# Patient Record
Sex: FEMALE | Race: ASIAN | NOT HISPANIC OR LATINO | Employment: UNEMPLOYED | URBAN - METROPOLITAN AREA
[De-identification: names, ages, dates, MRNs, and addresses within clinical notes are randomized per-mention and may not be internally consistent; named-entity substitution may affect disease eponyms.]

---

## 2017-12-29 ENCOUNTER — AMBULATORY - HEALTHEAST (OUTPATIENT)
Dept: FAMILY MEDICINE | Facility: CLINIC | Age: 22
End: 2017-12-29

## 2017-12-29 DIAGNOSIS — K62.5 BLOOD PER RECTUM: ICD-10-CM

## 2018-01-15 ENCOUNTER — OFFICE VISIT - HEALTHEAST (OUTPATIENT)
Dept: FAMILY MEDICINE | Facility: CLINIC | Age: 23
End: 2018-01-15

## 2018-01-15 ENCOUNTER — COMMUNICATION - HEALTHEAST (OUTPATIENT)
Dept: TELEHEALTH | Facility: CLINIC | Age: 23
End: 2018-01-15

## 2018-01-15 DIAGNOSIS — J30.9 ALLERGIC RHINITIS: ICD-10-CM

## 2018-01-15 DIAGNOSIS — R04.0 BLEEDING NOSE: ICD-10-CM

## 2018-01-15 DIAGNOSIS — H10.9 CONJUNCTIVITIS: ICD-10-CM

## 2018-01-15 DIAGNOSIS — T48.5X5A RHINITIS MEDICAMENTOSA: ICD-10-CM

## 2018-01-15 DIAGNOSIS — K62.5 BLEEDING PER RECTUM: ICD-10-CM

## 2018-01-15 DIAGNOSIS — J31.0 RHINITIS MEDICAMENTOSA: ICD-10-CM

## 2018-01-17 ENCOUNTER — COMMUNICATION - HEALTHEAST (OUTPATIENT)
Dept: FAMILY MEDICINE | Facility: CLINIC | Age: 23
End: 2018-01-17

## 2018-01-29 ENCOUNTER — COMMUNICATION - HEALTHEAST (OUTPATIENT)
Dept: FAMILY MEDICINE | Facility: CLINIC | Age: 23
End: 2018-01-29

## 2021-05-31 VITALS — WEIGHT: 146 LBS

## 2021-06-01 ENCOUNTER — RECORDS - HEALTHEAST (OUTPATIENT)
Dept: ADMINISTRATIVE | Facility: CLINIC | Age: 26
End: 2021-06-01

## 2021-06-15 NOTE — PROGRESS NOTES
"ASSESSMENT & PLAN      Cam Cotter was seen today for epistaxis and rectal bleeding.    Diagnoses and all orders for this visit:    Allergic rhinitis  -     Ambulatory referral to Allergy    Conjunctivitis  -     Ambulatory referral to Allergy    Bleeding nose    Bleeding per rectum    Rhinitis medicamentosa  -     Ambulatory referral to Allergy        No Follow-up on file.           CHIEF COMPLAINT: Cam Alvarez had concerns including Epistaxis and Rectal Bleeding.    [place]: 1.............. had concerns including Epistaxis and Rectal Bleeding.    1. Allergic rhinitis    2. Conjunctivitis    3. Bleeding nose    4. Bleeding per rectum    5. Rhinitis medicamentosa      No problem-specific Assessment & Plan notes found for this encounter.      CC:              Nosebleeds.    Feels like     2016 we talked about rhinitis medicamentosa she is taking a medicine that his naphazoline regularly and she states it helps  Always has mucus  Year-round  Feels clogged  There is itchiness  She also feels like her nasal area is bleeding left greater than right  Comes up both the back as well as the front      Also had bleeding from her bottom  Both blood in the toilet as well as on the paper  Feels like she is being torn  No palpable hemorrhoids  Is occasional last time it happened was in November  Her stools have been hard,  as a rule    She has 2 cats at home  She is with her partner  Does not use tobacco    What's it like:                  Heavy     How long is it ongoing:      \"Since I was little\"  What makes it worse :       na  What makes it better:         Na   0/10-10/10:Pain/Intesity     mild      Any associated Sx to above complaint:   Rectal bleeding with bowel movements. Inner corner of ees are itchy, red, pain on and off. Pt states she feels like there is fat on the back of her head.        SUBJECTIVE:  Cam Alvarez is a 22 y.o. female    No past medical history on file.  Past Surgical History:   Procedure Laterality Date     " SD REMOVAL OF OVARIAN CYST(S)      Description: Ovarian Cystectomy Right;  Recorded: 08/23/2012;     Review of patient's allergies indicates no known allergies.  Current Outpatient Prescriptions   Medication Sig Dispense Refill     loratadine (CLARITIN) 10 mg tablet Take 10 mg by mouth daily.       No current facility-administered medications for this visit.      No family history on file.  Social History     Social History     Marital status: Single     Spouse name: N/A     Number of children: N/A     Years of education: N/A     Social History Main Topics     Smoking status: Never Smoker     Smokeless tobacco: Never Used     Alcohol use No     Drug use: No     Sexual activity: Yes     Partners: Male     Birth control/ protection: None     Other Topics Concern     None     Social History Narrative     Patient Active Problem List   Diagnosis     Dysfunctional Uterine Bleeding                                              SOCIAL: She  reports that she has never smoked. She has never used smokeless tobacco. She reports that she does not drink alcohol or use illicit drugs.    REVIEW OF SYSTEMS:   Family history not pertinent to chief complaint or presenting problem    Review of systems otherwise negative as requested from patient, except   Those positive ROS outlined and discussed in [place].    OBJECTIVE:  /62  Pulse 66  Wt 146 lb (66.2 kg)  SpO2 99%    GENERAL:     No acute distress.   Alert and oriented X 3         Physical:    Sclera deep injection swelling of the conjunctiva  TMs clear  His mucosa left septum irritated hemorrhage areas  Right septum-year-old congested  Oropharynx is clear  She is Cavey left upper  Lungs are clear  Palpable thyroid no appreciable nodules  Cardiac no murmur  Mild allergic shiners  No edema  No tremulousness        ASSESSMENT & PLAN      Pa Cyndee was seen today for epistaxis and rectal bleeding.    Diagnoses and all orders for this visit:    Allergic rhinitis  -     Ambulatory  referral to Allergy    Conjunctivitis  -     Ambulatory referral to Allergy    Bleeding nose    Bleeding per rectum    Rhinitis medicamentosa  -     Ambulatory referral to Allergy        He will be referred to allergy  She may be having rhinitis medicamentosa from her eyedrops  She has 2 cats at home  I would favor a course of prednisone stop the naphazoline       No Follow-up on file.       Anticipatory Guidance and Symptomatic Cares Discussed   Advised to call back directly if there are further questions, or if these symptoms fail to improve as anticipated or worsen.  Return to clinic if patient has a clinical concern that warrants an exam.         25 Min Total Time, > 50% counseling and coordination of Care    Jose Eduardo Madrigal MD  Family Medicine   [facility] [address] [phone]

## 2021-06-27 ENCOUNTER — HEALTH MAINTENANCE LETTER (OUTPATIENT)
Age: 26
End: 2021-06-27

## 2021-07-04 ENCOUNTER — HOSPITAL ENCOUNTER (EMERGENCY)
Dept: EMERGENCY MEDICINE | Facility: HOSPITAL | Age: 26
Discharge: HOME | End: 2021-07-04
Attending: EMERGENCY MEDICINE

## 2021-07-04 DIAGNOSIS — J45.21 MILD INTERMITTENT ASTHMA WITH EXACERBATION: ICD-10-CM

## 2021-07-04 LAB
ALBUMIN SERPL-MCNC: 3.7 G/DL (ref 3.5–5)
ALP SERPL-CCNC: 85 U/L (ref 45–120)
ALT SERPL W P-5'-P-CCNC: 148 U/L (ref 0–45)
ANION GAP SERPL CALCULATED.3IONS-SCNC: 8 MMOL/L (ref 5–18)
AST SERPL W P-5'-P-CCNC: 106 U/L (ref 0–40)
BASOPHILS # BLD AUTO: 0.1 THOU/UL (ref 0–0.2)
BASOPHILS NFR BLD AUTO: 1 % (ref 0–2)
BILIRUB SERPL-MCNC: 0.4 MG/DL (ref 0–1)
BUN SERPL-MCNC: 12 MG/DL (ref 8–22)
CALCIUM SERPL-MCNC: 8.5 MG/DL (ref 8.5–10.5)
CHLORIDE BLD-SCNC: 107 MMOL/L (ref 98–107)
CO2 SERPL-SCNC: 25 MMOL/L (ref 22–31)
CREAT SERPL-MCNC: 0.69 MG/DL (ref 0.6–1.1)
D DIMER PPP FEU-MCNC: 0.63 FEU UG/ML
EOSINOPHIL # BLD AUTO: 0.2 THOU/UL (ref 0–0.4)
EOSINOPHIL NFR BLD AUTO: 2 % (ref 0–6)
ERYTHROCYTE [DISTWIDTH] IN BLOOD BY AUTOMATED COUNT: 13.7 % (ref 11–14.5)
GFR SERPL CREATININE-BSD FRML MDRD: >60 ML/MIN/1.73M2
GLUCOSE BLD-MCNC: 106 MG/DL (ref 70–125)
HCT VFR BLD AUTO: 39.7 % (ref 35–47)
HGB BLD-MCNC: 12.7 G/DL (ref 12–16)
IMM GRANULOCYTES # BLD: 0 THOU/UL
IMM GRANULOCYTES NFR BLD: 0 %
LYMPHOCYTES # BLD AUTO: 3.2 THOU/UL (ref 0.8–4.4)
LYMPHOCYTES NFR BLD AUTO: 36 % (ref 20–40)
MAGNESIUM SERPL-MCNC: 1.9 MG/DL (ref 1.8–2.6)
MCH RBC QN AUTO: 26.5 PG (ref 27–34)
MCHC RBC AUTO-ENTMCNC: 32 G/DL (ref 32–36)
MCV RBC AUTO: 83 FL (ref 80–100)
MONOCYTES # BLD AUTO: 0.2 THOU/UL (ref 0–0.9)
MONOCYTES NFR BLD AUTO: 3 % (ref 2–10)
NEUTROPHILS # BLD AUTO: 5.3 THOU/UL (ref 2–7.7)
NEUTROPHILS NFR BLD AUTO: 59 % (ref 50–70)
PLATELET # BLD AUTO: 76 THOU/UL (ref 140–440)
PMV BLD AUTO: 11.3 FL (ref 8.5–12.5)
POTASSIUM BLD-SCNC: 3.5 MMOL/L (ref 3.5–5)
PROT SERPL-MCNC: 7.9 G/DL (ref 6–8)
RBC # BLD AUTO: 4.79 MILL/UL (ref 3.8–5.4)
SARS-COV-2 PCR RESULT-HE - HISTORICAL: NEGATIVE
SODIUM SERPL-SCNC: 140 MMOL/L (ref 136–145)
TROPONIN I SERPL-MCNC: <0.01 NG/ML (ref 0–0.29)
WBC: 9.1 THOU/UL (ref 4–11)

## 2021-07-04 RX ORDER — ALBUTEROL SULFATE 90 UG/1
2 AEROSOL, METERED RESPIRATORY (INHALATION) EVERY 4 HOURS PRN
Qty: 1 INHALER | Refills: 0 | Status: SHIPPED | OUTPATIENT
Start: 2021-07-04 | End: 2022-03-08

## 2021-07-04 ASSESSMENT — MIFFLIN-ST. JEOR: SCORE: 1171.8

## 2021-07-04 NOTE — ED TRIAGE NOTES
ED Triage Notes by Fela Horowitz RN at 7/4/2021  4:37 PM     Author: Fela Horowitz RN Service: Post Procedure Care Author Type: Registered Nurse    Filed: 7/4/2021  4:37 PM Date of Service: 7/4/2021  4:37 PM Status: Signed    : Fela Horowitz RN (Registered Nurse)       Patient comes to ED with her  for shortness of breath that started 2 weeks ago.

## 2021-07-05 ENCOUNTER — COMMUNICATION - HEALTHEAST (OUTPATIENT)
Dept: SCHEDULING | Facility: CLINIC | Age: 26
End: 2021-07-05

## 2021-07-05 NOTE — ED PROVIDER NOTES
ED Provider Notes by Luis A Vivas MD at 7/4/2021  4:41 PM     Author: Luis A Vivas MD Service: -- Author Type: Physician    Filed: 7/4/2021  7:49 PM Date of Service: 7/4/2021  4:41 PM Status: Signed    : Luis A Vivas MD (Physician)       EMERGENCY DEPARTMENT ENCOUNTER      NAME: Cam Alvarez  AGE: 26 y.o. female  YOB: 1995  MRN: [id number]  EVALUATION DATE & TIME: 7/4/2021  4:39 PM    PCP: Provider, No Primary Care    ED PROVIDER: SIMÓN Vivas      Chief Complaint   Patient presents with   ? Shortness of Breath     FINAL IMPRESSION:  1. Mild intermittent asthma with exacerbation        ED COURSE & MEDICAL DECISION MAKING:    Pertinent Labs & Imaging studies reviewed. (See chart for details)  26 y.o. female presents to the Emergency Department for evaluation of shortness of breath.  History of mild intermittent asthma.  Usually patient utilizes an inhaler but is currently out of her albuterol.  Several days duration of symptoms.  Initially felt it was allergy mediated with some increased pressure and congestion but reports no runny nose.  She also did have a single left nare bloody nose preceding her symptoms.  It was minor per her report.  Now presenting with persistent shortness of breath.  Denies chest pain.  Symptoms worse with activity.  On clinical examination patient was well-appearing.  Unremarkable vital signs.  Clinical examination was normal with no concerning exam findings.  I recommended the patient that we proceed with imaging of her chest.  Laboratory testing including D-dimer and troponin ECG.  She is not COVID-19 vaccinated and have sent off COVID-19 testing.  Will reassess after return of her test results    4:58 PM I performed my initial history and physical exam as well as discussed ED course and plan.    7:43 PM I rechecked and updated the patient.    Patient overall is improved she felt the nebulization treatment did result in significant improvement to  her symptoms.  CTA performed due to shortness of breath and elevated D-dimer was negative with the exception of air trapping which likely secondary to her asthma.  She received an oral dose of steroids.  COVID-19 testing was negative.  Work-up otherwise unremarkable.  Plan of care for discharge.  Short course of steroids for acute asthmatic exacerbation.  She was out of her albuterol inhaler and this was provided as well as a prescription.  Discussed treatment plan return precautions prior to discharge.    Plan and all results were discussed  Time was taken to answer all questions. Patient and/or associated parties expressed understanding and were agreeable to the treatment plan.  Warning signs and ER return precautions provided. Discharged with discharge instructions outlining plan for further care and follow up.       MEDICATIONS GIVEN IN THE EMERGENCY:  Medications   sodium chloride flush 10 mL (NS) (10 mL Intravenous Given 7/4/21 1700)   predniSONE tablet 40 mg (DELTASONE) (has no administration in time range)   iopamidol solution 100 mL (ISOVUE-370) (100 mL Intravenous Given 7/4/21 1837)   ipratropium-albuteroL 0.5-2.5 mg/3 mL nebulizer solution 3 mL (DUO-NEB) (3 mL Nebulization Given 7/4/21 1924)       NEW PRESCRIPTIONS STARTED AT TODAY'S ER VISIT  Current Discharge Medication List      START taking these medications    Details   albuterol (PROAIR HFA;PROVENTIL HFA;VENTOLIN HFA) 90 mcg/actuation inhaler Inhale 2 puffs every 4 (four) hours as needed for wheezing.  Qty: 1 Inhaler, Refills: 0    Comments: May substitute the equivalent medication per insurance preference.  Associated Diagnoses: Mild intermittent asthma with exacerbation      predniSONE (DELTASONE) 20 MG tablet Take 40 mg by mouth daily for 4 days.  Qty: 8 tablet, Refills: 0    Associated Diagnoses: Mild intermittent asthma with exacerbation         CONTINUE these medications which have NOT CHANGED    Details   loratadine (CLARITIN) 10 mg tablet  Take 10 mg by mouth daily.                =================================================================    HPI    Patient information was obtained from: Patient    Use of Intrepreter: N/A         Pa Cyndee Alvarez is a 26 y.o. female who presents to the ED  with a relevant medical history of dysfunctional uterine bleeding via private car for evaluation of shortness of breath.    Patient reports shortness of breath and dizziness for the past 2 weeks but has gotten worse over past couple days following a bloody nose and notes increased sinus pressure and congestion. She adds that this might be due to an allergy but denies a runny nose. Patient comments that her symptoms are worsened with activity. She denies chest pain and has not received her COVID-19 vaccination. Patient denies any additional symptoms at this time.      REVIEW OF SYSTEMS   Review of Systems   HENT: Positive for congestion and sinus pressure. Negative for rhinorrhea.    Respiratory: Positive for shortness of breath.    Cardiovascular: Negative for chest pain.   Neurological: Positive for dizziness.   All other systems reviewed and are negative.       PAST MEDICAL HISTORY:  Asthma  Environmental allergies    PAST SURGICAL HISTORY:  Past Surgical History:   Procedure Laterality Date   ? ME REMOVAL OF OVARIAN CYST(S)      Description: Ovarian Cystectomy Right;  Recorded: 08/23/2012;           CURRENT MEDICATIONS:    No current facility-administered medications on file prior to encounter.      Current Outpatient Medications on File Prior to Encounter   Medication Sig   ? loratadine (CLARITIN) 10 mg tablet Take 10 mg by mouth daily.   Albuterol    ALLERGIES:  No Known Allergies    FAMILY HISTORY:  No family history on file.    SOCIAL HISTORY:   Social History     Socioeconomic History   ? Marital status:      Spouse name: Not on file   ? Number of children: Not on file   ? Years of education: Not on file   ? Highest education level: Not on file  "  Occupational History   ? Not on file   Social Needs   ? Financial resource strain: Not on file   ? Food insecurity     Worry: Not on file     Inability: Not on file   ? Transportation needs     Medical: Not on file     Non-medical: Not on file   Tobacco Use   ? Smoking status: Never Smoker   ? Smokeless tobacco: Never Used   Substance and Sexual Activity   ? Alcohol use: No   ? Drug use: No   ? Sexual activity: Yes     Partners: Male     Birth control/protection: None   Lifestyle   ? Physical activity     Days per week: Not on file     Minutes per session: Not on file   ? Stress: Not on file   Relationships   ? Social connections     Talks on phone: Not on file     Gets together: Not on file     Attends Tenriism service: Not on file     Active member of club or organization: Not on file     Attends meetings of clubs or organizations: Not on file     Relationship status: Not on file   ? Intimate partner violence     Fear of current or ex partner: Not on file     Emotionally abused: Not on file     Physically abused: Not on file     Forced sexual activity: Not on file   Other Topics Concern   ? Not on file   Social History Narrative   ? Not on file       VITALS:  Patient Vitals for the past 24 hrs:   BP Temp Temp src Pulse Resp SpO2 Height Weight   07/04/21 1930 107/69 -- -- 71 -- 100 % -- --   07/04/21 1815 113/66 -- -- 64 -- 97 % -- --   07/04/21 1800 105/64 -- -- 72 -- 97 % -- --   07/04/21 1745 108/67 -- -- 69 -- 96 % -- --   07/04/21 1730 113/72 -- -- 76 -- 98 % -- --   07/04/21 1715 108/70 -- -- 76 -- 96 % -- --   07/04/21 1700 106/67 -- -- 71 -- 96 % -- --   07/04/21 1645 131/83 -- -- 88 -- 97 % -- --   07/04/21 1644 117/77 -- -- 83 -- 97 % -- --   07/04/21 1635 114/74 97.8  F (36.6  C) Oral 79 18 95 % 4' 11\" (1.499 m) 116 lb (52.6 kg)       PHYSICAL EXAM   Constitutional:  Well developed, Well nourished, NAD  HENT:  Normocephalic, Atraumatic, Bilateral external ears normal, Oropharynx moist Nose normal.  " Bilateral nares were unremarkable with no abnormalities appreciated posterior oropharyngeal examination unremarkable with no abnormalities  Neck: Normal range of motion no lymphadenopathy no meningeal signs no palpable masses or swelling appreciated  Eyes: Conjunctiva normal, No discharge.   Respiratory:  Normal breath sounds, No respiratory distress, No wheezing.  No cough.  Cardiovascular:  Normal heart rate, Normal rhythm. No murmur appreciated.  Chest wall non-tender.  GI:  Soft, No tenderness, No masses, No flank tenderness.  No rebound or guarding.  : No CVA tenderness  Musculoskeletal: Full ROM.  No edema appreciated.  No cyanosis. Good ROM of major joints.  Integument:  Warm, Dry, No erythema, No rash.    Neurologic:  Alert & oriented.  No focal deficits appreciated.  Ambulatory.  Psychiatric:  Affect normal, Judgment normal, Mood normal.     LAB:  All pertinent labs reviewed and interpreted.  Results for orders placed or performed during the hospital encounter of 07/04/21   Troponin I   Result Value Ref Range    Troponin I <0.01 0.00 - 0.29 ng/mL   D-dimer, Quantitative   Result Value Ref Range    D-Dimer, Quant 0.63 (H) <=0.50 FEU ug/mL   Comprehensive Metabolic Panel   Result Value Ref Range    Sodium 140 136 - 145 mmol/L    Potassium 3.5 3.5 - 5.0 mmol/L    Chloride 107 98 - 107 mmol/L    CO2 25 22 - 31 mmol/L    Anion Gap, Calculation 8 5 - 18 mmol/L    Glucose 106 70 - 125 mg/dL    BUN 12 8 - 22 mg/dL    Creatinine 0.69 0.60 - 1.10 mg/dL    GFR MDRD Af Amer >60 >60 mL/min/1.73m2    GFR MDRD Non Af Amer >60 >60 mL/min/1.73m2    Bilirubin, Total 0.4 0.0 - 1.0 mg/dL    Calcium 8.5 8.5 - 10.5 mg/dL    Protein, Total 7.9 6.0 - 8.0 g/dL    Albumin 3.7 3.5 - 5.0 g/dL    Alkaline Phosphatase 85 45 - 120 U/L     (H) 0 - 40 U/L     (H) 0 - 45 U/L   Magnesium   Result Value Ref Range    Magnesium 1.9 1.8 - 2.6 mg/dL   Symptomatic SARS-CoV-2 (COVID-19)-PCR    Specimen: Respiratory   Result Value  Ref Range    SARS-CoV-2 PCR Result Negative Negative, Invalid   HM1 (CBC with Diff)   Result Value Ref Range    WBC 9.1 4.0 - 11.0 thou/uL    RBC 4.79 3.80 - 5.40 mill/uL    Hemoglobin 12.7 12.0 - 16.0 g/dL    Hematocrit 39.7 35.0 - 47.0 %    MCV 83 80 - 100 fL    MCH 26.5 (L) 27.0 - 34.0 pg    MCHC 32.0 32.0 - 36.0 g/dL    RDW 13.7 11.0 - 14.5 %    Platelets 76 (L) 140 - 440 thou/uL    MPV 11.3 8.5 - 12.5 fL    Neutrophils % 59 50 - 70 %    Lymphocytes % 36 20 - 40 %    Monocytes % 3 2 - 10 %    Eosinophils % 2 0 - 6 %    Basophils % 1 0 - 2 %    Immature Granulocyte % 0 <=0 %    Neutrophils Absolute 5.3 2.0 - 7.7 thou/uL    Lymphocytes Absolute 3.2 0.8 - 4.4 thou/uL    Monocytes Absolute 0.2 0.0 - 0.9 thou/uL    Eosinophils Absolute 0.2 0.0 - 0.4 thou/uL    Basophils Absolute 0.1 0.0 - 0.2 thou/uL    Immature Granulocyte Absolute 0.0 <=0.0 thou/uL       RADIOLOGY:  Reviewed all pertinent imaging. Please see official radiology report.  Cta Chest Pe Run    Result Date: 7/4/2021  EXAM: CTA CHEST PE RUN LOCATION: [facility] DATE/TIME: 7/4/2021 6:37 PM INDICATION: sob increased ddimer COMPARISON: None. TECHNIQUE: CT chest pulmonary angiogram during arterial phase injection of IV contrast. Multiplanar reformats and MIP reconstructions were performed. Dose reduction techniques were used. CONTRAST: Iopamidol (Isovue-370) 100mL FINDINGS: ANGIOGRAM CHEST: Pulmonary arteries are normal caliber and negative for pulmonary emboli. Thoracic aorta is negative for dissection. No CT evidence of right heart strain. LUNGS AND PLEURA: Mosaic attenuation in the right middle lobe and lung bases can be seen with air trapping. MEDIASTINUM/AXILLAE: Normal. CORONARY ARTERY CALCIFICATION: None. UPPER ABDOMEN: Hepatomegaly with fatty infiltration of the liver. MUSCULOSKELETAL: Normal.     1.  No pulmonary embolism. 2.  Mosaic attenuation in the right middle lobe and both lung bases can be seen with air trapping. 3.   Hepatomegaly with fatty infiltration of the liver.      EKG:    Performed at: 1740    Impression: Normal sinus Rhythm     Rate: 67 bpm  Rhythm: Normal  Axis: 28 18 15   ND Interval: 152 ms  QRS Interval: 88 ms  QTc Interval: 412 ms  ST Changes: None  Comparison: No previous EKG    I have independently reviewed and interpreted the EKG(s) documented above.      I, Kenan Ortega , am serving as a scribe to document services personally performed by Dr. Vivas based on my observation and the provider's statements to me. I, Luis A Vivas MD attest that Kenan Ortega is acting in a scribe capacity, has observed my performance of the services and has documented them in accordance with my direction.    Luis A Vivas M.D.  Emergency Medicine  [facility] EMERGENCY DEPARTMENT  [address]  [facility] [address]  Dept: [phone]  Loc: [phone]     Luis A Vivas MD  07/04/21 1949

## 2021-07-06 VITALS — BODY MASS INDEX: 23.39 KG/M2 | HEIGHT: 59 IN | WEIGHT: 116 LBS

## 2021-07-06 LAB
ATRIAL RATE - MUSE: 67 BPM
DIASTOLIC BLOOD PRESSURE - MUSE: NORMAL
INTERPRETATION ECG - MUSE: NORMAL
P AXIS - MUSE: 28 DEGREES
PR INTERVAL - MUSE: 152 MS
QRS DURATION - MUSE: 88 MS
QT - MUSE: 390 MS
QTC - MUSE: 412 MS
R AXIS - MUSE: 18 DEGREES
SYSTOLIC BLOOD PRESSURE - MUSE: NORMAL
T AXIS - MUSE: 15 DEGREES
VENTRICULAR RATE- MUSE: 67 BPM

## 2021-10-17 ENCOUNTER — HEALTH MAINTENANCE LETTER (OUTPATIENT)
Age: 26
End: 2021-10-17

## 2022-03-04 ENCOUNTER — TELEPHONE (OUTPATIENT)
Dept: FAMILY MEDICINE | Facility: CLINIC | Age: 27
End: 2022-03-04

## 2022-03-04 NOTE — TELEPHONE ENCOUNTER
Reason for Call:  Other call back    Detailed comments: Pt has not been seen for a while, but family calling and asking that PCP be notified that she has been diagnosed with Leukemia      FYI    Phone Number Patient can be reached at: Cell number on file:    Telephone Information:   Mobile [phone]       Best Time: anytime    Can we leave a detailed message on this number? YES    Call taken on 3/4/2022 at 11:43 AM by Bhavani Maharaj

## 2022-03-08 ENCOUNTER — OFFICE VISIT (OUTPATIENT)
Dept: FAMILY MEDICINE | Facility: CLINIC | Age: 27
End: 2022-03-08
Payer: COMMERCIAL

## 2022-03-08 ENCOUNTER — ANCILLARY PROCEDURE (OUTPATIENT)
Dept: GENERAL RADIOLOGY | Facility: CLINIC | Age: 27
End: 2022-03-08
Attending: FAMILY MEDICINE
Payer: COMMERCIAL

## 2022-03-08 VITALS
WEIGHT: 154 LBS | HEART RATE: 100 BPM | DIASTOLIC BLOOD PRESSURE: 70 MMHG | OXYGEN SATURATION: 98 % | BODY MASS INDEX: 31.1 KG/M2 | SYSTOLIC BLOOD PRESSURE: 122 MMHG

## 2022-03-08 DIAGNOSIS — E66.811 CLASS 1 OBESITY DUE TO EXCESS CALORIES WITH BODY MASS INDEX (BMI) OF 31.0 TO 31.9 IN ADULT, UNSPECIFIED WHETHER SERIOUS COMORBIDITY PRESENT: ICD-10-CM

## 2022-03-08 DIAGNOSIS — J40 BRONCHITIS: Primary | ICD-10-CM

## 2022-03-08 DIAGNOSIS — R91.8 ABNORMAL CT SCAN OF LUNG: ICD-10-CM

## 2022-03-08 DIAGNOSIS — D69.6 THROMBOCYTOPENIA (H): ICD-10-CM

## 2022-03-08 DIAGNOSIS — R79.9 ABNORMAL BLOOD FINDINGS: ICD-10-CM

## 2022-03-08 DIAGNOSIS — G56.01 CARPAL TUNNEL SYNDROME OF RIGHT WRIST: ICD-10-CM

## 2022-03-08 DIAGNOSIS — R74.02 NONSPECIFIC ELEVATION OF LEVELS OF TRANSAMINASE OR LACTIC ACID DEHYDROGENASE (LDH): ICD-10-CM

## 2022-03-08 DIAGNOSIS — R74.01 NONSPECIFIC ELEVATION OF LEVELS OF TRANSAMINASE OR LACTIC ACID DEHYDROGENASE (LDH): ICD-10-CM

## 2022-03-08 DIAGNOSIS — E66.09 CLASS 1 OBESITY DUE TO EXCESS CALORIES WITH BODY MASS INDEX (BMI) OF 31.0 TO 31.9 IN ADULT, UNSPECIFIED WHETHER SERIOUS COMORBIDITY PRESENT: ICD-10-CM

## 2022-03-08 DIAGNOSIS — K76.0 FATTY LIVER: Primary | ICD-10-CM

## 2022-03-08 LAB
BASOPHILS # BLD AUTO: 0.1 10E3/UL (ref 0–0.2)
BASOPHILS NFR BLD AUTO: 1 %
EOSINOPHIL # BLD AUTO: 0.1 10E3/UL (ref 0–0.7)
EOSINOPHIL NFR BLD AUTO: 1 %
ERYTHROCYTE [DISTWIDTH] IN BLOOD BY AUTOMATED COUNT: 16.5 % (ref 10–15)
HBA1C MFR BLD: 5.1 % (ref 0–5.6)
HCT VFR BLD AUTO: 45 % (ref 35–47)
HGB BLD-MCNC: 14.2 G/DL (ref 11.7–15.7)
IMM GRANULOCYTES # BLD: 0 10E3/UL
IMM GRANULOCYTES NFR BLD: 0 %
LYMPHOCYTES # BLD AUTO: 2.8 10E3/UL (ref 0.8–5.3)
LYMPHOCYTES NFR BLD AUTO: 37 %
MCH RBC QN AUTO: 24.9 PG (ref 26.5–33)
MCHC RBC AUTO-ENTMCNC: 31.6 G/DL (ref 31.5–36.5)
MCV RBC AUTO: 79 FL (ref 78–100)
MONOCYTES # BLD AUTO: 0.2 10E3/UL (ref 0–1.3)
MONOCYTES NFR BLD AUTO: 2 %
NEUTROPHILS # BLD AUTO: 4.4 10E3/UL (ref 1.6–8.3)
NEUTROPHILS NFR BLD AUTO: 59 %
NRBC # BLD AUTO: 0 10E3/UL
NRBC BLD AUTO-RTO: 0 /100
PLATELET # BLD AUTO: 77 10E3/UL (ref 150–450)
RBC # BLD AUTO: 5.7 10E6/UL (ref 3.8–5.2)
RETICS # AUTO: 0.06 10E6/UL (ref 0.01–0.11)
RETICS/RBC NFR AUTO: 1 % (ref 0.8–2.7)
WBC # BLD AUTO: 7.6 10E3/UL (ref 4–11)

## 2022-03-08 PROCEDURE — 99204 OFFICE O/P NEW MOD 45 MIN: CPT | Performed by: FAMILY MEDICINE

## 2022-03-08 PROCEDURE — 86682 HELMINTH ANTIBODY: CPT | Mod: 90 | Performed by: FAMILY MEDICINE

## 2022-03-08 PROCEDURE — 83021 HEMOGLOBIN CHROMOTOGRAPHY: CPT | Performed by: FAMILY MEDICINE

## 2022-03-08 PROCEDURE — 85025 COMPLETE CBC W/AUTO DIFF WBC: CPT | Performed by: FAMILY MEDICINE

## 2022-03-08 PROCEDURE — 83036 HEMOGLOBIN GLYCOSYLATED A1C: CPT | Performed by: FAMILY MEDICINE

## 2022-03-08 PROCEDURE — 82977 ASSAY OF GGT: CPT | Performed by: FAMILY MEDICINE

## 2022-03-08 PROCEDURE — 80076 HEPATIC FUNCTION PANEL: CPT | Performed by: FAMILY MEDICINE

## 2022-03-08 PROCEDURE — 85060 BLOOD SMEAR INTERPRETATION: CPT | Performed by: PATHOLOGY

## 2022-03-08 PROCEDURE — 99000 SPECIMEN HANDLING OFFICE-LAB: CPT | Performed by: FAMILY MEDICINE

## 2022-03-08 PROCEDURE — 83020 HEMOGLOBIN ELECTROPHORESIS: CPT | Performed by: FAMILY MEDICINE

## 2022-03-08 PROCEDURE — 71046 X-RAY EXAM CHEST 2 VIEWS: CPT | Mod: TC | Performed by: RADIOLOGY

## 2022-03-08 PROCEDURE — 85045 AUTOMATED RETICULOCYTE COUNT: CPT | Performed by: FAMILY MEDICINE

## 2022-03-08 PROCEDURE — 36415 COLL VENOUS BLD VENIPUNCTURE: CPT | Performed by: FAMILY MEDICINE

## 2022-03-08 RX ORDER — ONDANSETRON 4 MG/1
TABLET, FILM COATED ORAL
COMMUNITY
Start: 2022-03-05

## 2022-03-08 RX ORDER — DOXYCYCLINE 100 MG/1
100 CAPSULE ORAL 2 TIMES DAILY
Qty: 20 CAPSULE | Refills: 0 | Status: SHIPPED | OUTPATIENT
Start: 2022-03-08 | End: 2022-03-18

## 2022-03-08 RX ORDER — ALBENDAZOLE 200 MG/1
TABLET, FILM COATED ORAL
COMMUNITY
Start: 2022-03-05

## 2022-03-08 NOTE — PATIENT INSTRUCTIONS
Thanks for coming in today PaShia and Bee    Things I have understood from the prior exams    You had a history of a slightly abnormal CT scan although it does not sound like you are having any coughing issues    We will go ahead and repeat your chest x-ray    You have elevated liver test  You have evidence of a fatty liver on your CT scan  The way to remedy this is eliminate simple sugars, at all added sugars, and simple carbohydrates from your nutrition as well as engage in cardiovascular fitness  A ketogenic diet would be reasonable for you    You likely have carpal tunnel syndrome  You should wear a carpal tunnel brace  When you get back to [place] or in the future you should see one of our hand surgeons here to evaluate for underlying carpal tunnel syndrome  I will make a referral to an orthopedist

## 2022-03-08 NOTE — PROGRESS NOTES
Problem List Items Addressed This Visit        Nervous and Auditory    Carpal tunnel syndrome of right wrist    Relevant Orders    Orthopedic  Referral      Other Visit Diagnoses     Fatty liver    -  Primary    Relevant Orders    Hemoglobin A1c (Completed)    Nonspecific elevation of levels of transaminase or lactic acid dehydrogenase (LDH)        Relevant Orders    Lab Blood Morphology Pathologist Review (Completed)    Strongyloides antibody IgG (Completed)    GGT (Completed)    Hepatic panel (Albumin, ALT, AST, Bili, Alk Phos, TP) (Completed)    Thrombocytopenia (H)        Relevant Orders    Lab Blood Morphology Pathologist Review (Completed)    Class 1 obesity due to excess calories with body mass index (BMI) of 31.0 to 31.9 in adult, unspecified whether serious comorbidity present        Relevant Orders    Hemoglobin A1c (Completed)    Abnormal CT scan of lung        Relevant Orders    XR Chest 2 Views (Completed)    Abnormal blood findings        Relevant Orders    Hemoglobinopathy/Thalassemia Cascade        Taking albendazole Stools better  No Diarrhea   Saw worms   On med  Less Itching   Had Low Platelets     ASSESSMENT & PLAN    No problem-specific Assessment & Plan notes found for this encounter.      Cam Cotter was seen today for low blood counts.    Diagnoses and all orders for this visit:    Fatty liver  -     Hemoglobin A1c; Future    Nonspecific elevation of levels of transaminase or lactic acid dehydrogenase (LDH)  -     Lab Blood Morphology Pathologist Review; Future  -     Strongyloides antibody IgG; Future  -     GGT; Future  -     Hepatic panel (Albumin, ALT, AST, Bili, Alk Phos, TP); Future    Thrombocytopenia (H)  -     Lab Blood Morphology Pathologist Review; Future    Class 1 obesity due to excess calories with body mass index (BMI) of 31.0 to 31.9 in adult, unspecified whether serious comorbidity present  -     Hemoglobin A1c; Future        There are no Patient Instructions on file for  this visit.    No follow-ups on file.       PATIENT HEALTH QUESTIONNAIRE-9 (PHQ - 9)    Over the last 2 weeks, how often have you been bothered by any of the following problems?    1. Little interest or pleasure in doing things -      2. Feeling down, depressed, or hopeless -      3. Trouble falling or staying asleep, or sleeping too much -     4. Feeling tired or having little energy -      5. Poor appetite or overeating -      6. Feeling bad about yourself - or that you are a failure or have let yourself or your family down -      7. Trouble concentrating on things, such as reading the newspaper or watching television -     8. Moving or speaking so slowly that other people could have noticed? Or the opposite - being so fidgety or restless that you have been moving around a lot more than usual     9. Thoughts that you would be better off dead or of hurting  yourself in some way     Total Score:       If you checked off any problems, how difficult have these problems made it for you to do your work, take care of things at home, or get along with other people?      Developed by Francois Hernandez, Luna Damon, Umer Hauser and colleagues, with an educational lianet from [employer] No permission required to reproduce, translate, display or distribute. permission required to reproduce, translate, display or distribute.    CHIEF COMPLAINT: Cam Alvarez had concerns including low blood counts.    [place]: 1.............. SUBJECTIVE:  Cam Alvarez is a 26 year old female had concerns including low blood counts.    1. Fatty liver    2. Nonspecific elevation of levels of transaminase or lactic acid dehydrogenase (LDH)    3. Thrombocytopenia (H)    4. Class 1 obesity due to excess calories with body mass index (BMI) of 31.0 to 31.9 in adult, unspecified whether serious comorbidity present          No Known Allergies                      SOCIAL: She  reports that she has never smoked. She has never used smokeless  tobacco. She reports that she does not drink alcohol and does not use drugs.    REVIEW OF SYSTEMS:   Family history not pertinent to chief complaint or presenting problem    Review of systems otherwise negative as requested from patient, except   Those positive ROS outlined and discussed in [place].      VITALS:  Vitals:    03/08/22 1545   BP: 122/70   Pulse: 100   SpO2: 98%   Weight: 69.9 kg (154 lb)     Wt Readings from Last 3 Encounters:   03/08/22 69.9 kg (154 lb)   01/15/18 66.2 kg (146 lb)   01/14/16 61.3 kg (135 lb 4 oz)     Body mass index is 31.1 kg/m .    Physical Exam:  Positive Tinel's sign negative Spurling's maneuver    Lungs clear  Cardiac no murmur  No thyromegaly  No appreciable spleen or liver edge    Recent Results (from the past 240 hour(s))   Strongyloides antibody IgG    Collection Time: 03/08/22  4:35 PM   Result Value Ref Range    Strongyloides Robyn IgG 0.7 <=0.9 IV   GGT    Collection Time: 03/08/22  4:35 PM   Result Value Ref Range    GGT 43 0 - 50 U/L   Hepatic panel (Albumin, ALT, AST, Bili, Alk Phos, TP)    Collection Time: 03/08/22  4:35 PM   Result Value Ref Range    Bilirubin Total 0.6 0.0 - 1.0 mg/dL    Bilirubin Direct 0.2 <=0.5 mg/dL    Protein Total 8.0 6.0 - 8.0 g/dL    Albumin 3.8 3.5 - 5.0 g/dL    Alkaline Phosphatase 95 45 - 120 U/L    AST 57 (H) 0 - 40 U/L    ALT 86 (H) 0 - 45 U/L   Hemoglobin A1c    Collection Time: 03/08/22  4:35 PM   Result Value Ref Range    Hemoglobin A1C 5.1 0.0 - 5.6 %   Bld morphology pathology review    Collection Time: 03/08/22  4:35 PM   Result Value Ref Range    Final Diagnosis       PERIPHERAL BLOOD:    HYPOCHROMIC ERYTHROCYTOSIS (PLEASE SEE COMMENT)    PENDING HEMOGLOBIN STUDIES    THROMBOCYTOPENIA      Comment       The limited clinical history has been reviewed. The high normal hemoglobin and hematocrit tend to argue against an underlying disorder of globin synthesis; however, dehydration or a similar process certainly could complicate the issue.  The limited clinical history precludes comparison to prior CBCs. Please correlate with family history and iron studies. I am happy to review any subsequent data. We will revisit this case when I review the hemoglobin studies.       Clinical Information       Low platelets      Peripheral Smear       Red blood cells are increased in number and overall hypochromic and borderline microcytic. Anisopoikilocytosis and polychromasia are increased but rouleaux formation does not appear prominent.    The white blood cell count and differential appear as reported on the CBC. Leukocytes are normal in number and appearance, consisting predominantly of segmented and band neutrophils with fewer numbers of lymphocytes and monocytes. No blasts or dysplastic changes are identified.    Platelets are decreased in number but overall normal in appearance.       Performing Labs       The technical component of this testing was completed at [facility]     CBC with platelets and differential    Collection Time: 03/08/22  4:35 PM   Result Value Ref Range    WBC Count 7.6 4.0 - 11.0 10e3/uL    RBC Count 5.70 (H) 3.80 - 5.20 10e6/uL    Hemoglobin 14.2 11.7 - 15.7 g/dL    Hematocrit 45.0 35.0 - 47.0 %    MCV 79 78 - 100 fL    MCH 24.9 (L) 26.5 - 33.0 pg    MCHC 31.6 31.5 - 36.5 g/dL    RDW 16.5 (H) 10.0 - 15.0 %    Platelet Count 77 (L) 150 - 450 10e3/uL    % Neutrophils 59 %    % Lymphocytes 37 %    % Monocytes 2 %    % Eosinophils 1 %    % Basophils 1 %    % Immature Granulocytes 0 %    NRBCs per 100 WBC 0 <1 /100    Absolute Neutrophils 4.4 1.6 - 8.3 10e3/uL    Absolute Lymphocytes 2.8 0.8 - 5.3 10e3/uL    Absolute Monocytes 0.2 0.0 - 1.3 10e3/uL    Absolute Eosinophils 0.1 0.0 - 0.7 10e3/uL    Absolute Basophils 0.1 0.0 - 0.2 10e3/uL    Absolute Immature Granulocytes 0.0 <=0.4 10e3/uL    Absolute NRBCs 0.0 10e3/uL   Reticulocyte count    Collection Time: 03/08/22  4:35 PM   Result Value Ref Range    %  Reticulocyte 1.0 0.8 - 2.7 %    Absolute Reticulocyte 0.055 0.010 - 0.110 10e6/uL          I spent 30  minutes with this patient.  This includes pre-visit, intra-visit and post visit work an evaluation with regards to Cam Cotter was seen today for low blood counts.    Diagnoses and all orders for this visit:    Fatty liver  -     Hemoglobin A1c; Future    Nonspecific elevation of levels of transaminase or lactic acid dehydrogenase (LDH)  -     Lab Blood Morphology Pathologist Review; Future  -     Strongyloides antibody IgG; Future  -     GGT; Future  -     Hepatic panel (Albumin, ALT, AST, Bili, Alk Phos, TP); Future    Thrombocytopenia (H)  -     Lab Blood Morphology Pathologist Review; Future    Class 1 obesity due to excess calories with body mass index (BMI) of 31.0 to 31.9 in adult, unspecified whether serious comorbidity present  -     Hemoglobin A1c; Future        Jose Eduardo Madrigal MD  Family Medicine   [facility] [phone]

## 2022-03-09 LAB
ALBUMIN SERPL-MCNC: 3.8 G/DL (ref 3.5–5)
ALP SERPL-CCNC: 95 U/L (ref 45–120)
ALT SERPL W P-5'-P-CCNC: 86 U/L (ref 0–45)
AST SERPL W P-5'-P-CCNC: 57 U/L (ref 0–40)
BILIRUB DIRECT SERPL-MCNC: 0.2 MG/DL
BILIRUB SERPL-MCNC: 0.6 MG/DL (ref 0–1)
GGT SERPL-CCNC: 43 U/L (ref 0–50)
PATH REPORT.COMMENTS IMP SPEC: NORMAL
PATH REPORT.COMMENTS IMP SPEC: NORMAL
PATH REPORT.FINAL DX SPEC: NORMAL
PATH REPORT.MICROSCOPIC SPEC OTHER STN: NORMAL
PATH REPORT.RELEVANT HX SPEC: NORMAL
PROT SERPL-MCNC: 8 G/DL (ref 6–8)

## 2022-03-09 NOTE — TELEPHONE ENCOUNTER
DIAGNOSIS: Carpal tunnel syndrome of right wrist/no imaging/Jose Eduardo Madrigal   APPOINTMENT DATE: 3.10.22   NOTES STATUS DETAILS   OFFICE NOTE from referring provider Internal 3.8.22 [facility]   MEDICATION LIST Internal

## 2022-03-10 ENCOUNTER — PRE VISIT (OUTPATIENT)
Dept: ORTHOPEDICS | Facility: CLINIC | Age: 27
End: 2022-03-10
Payer: COMMERCIAL

## 2022-03-10 ENCOUNTER — OFFICE VISIT (OUTPATIENT)
Dept: ORTHOPEDICS | Facility: CLINIC | Age: 27
End: 2022-03-10
Attending: FAMILY MEDICINE
Payer: COMMERCIAL

## 2022-03-10 VITALS — BODY MASS INDEX: 31.04 KG/M2 | WEIGHT: 154 LBS | HEIGHT: 59 IN

## 2022-03-10 DIAGNOSIS — G56.01 CARPAL TUNNEL SYNDROME OF RIGHT WRIST: ICD-10-CM

## 2022-03-10 PROCEDURE — 99203 OFFICE O/P NEW LOW 30 MIN: CPT | Performed by: FAMILY MEDICINE

## 2022-03-10 NOTE — LETTER
"  3/10/2022      RE: Cam Alvarez  [address] [place] [address]       Sports Medicine Clinic Visit    PCP: Jose Eduardo Madrigal Cyndee Alvarez is a 26 year old female who is seen  in consultation at the request of Dr. Madrigal presenting with right wrist carpal tunnel syndrome sx. Right hand dominant.     She has a tendency to notice aching discomfort in the forearm and into the hand that is worse in the early hours of the morning.  She will have numbness that she will feel \"in all the fingers\" of the right hand, although the numbness is intermittent.  It can be present towards the end of the day.  Is not present at this time.  Does happen intermittently.    She is not involved in employment that involves repetitive gripping and grasping at this time.     Injury: No injury, onset about 6 months ago    Location of Pain: right wrist and hand  Duration of Pain: 6 months  Rating of Pain: 0/10  Pain is better with: massage  Pain is worse with: at night   Additional Features: paresthesias   Treatment so far consists of: massage  Prior History of related problems: none    Ht 1.499 m (4' 11\")   Wt 69.9 kg (154 lb)   BMI 31.10 kg/m         PMH:  No past medical history on file.    Active problem list:  Patient Active Problem List   Diagnosis     Dysfunctional Uterine Bleeding     Carpal tunnel syndrome of right wrist       FH:  No family history on file.    SH:  Social History     Socioeconomic History     Marital status:      Spouse name: Not on file     Number of children: Not on file     Years of education: Not on file     Highest education level: Not on file   Occupational History     Not on file   Tobacco Use     Smoking status: Never Smoker     Smokeless tobacco: Never Used   Substance and Sexual Activity     Alcohol use: No     Drug use: No     Sexual activity: Yes     Partners: Male     Birth control/protection: None   Other Topics Concern     Not on file   Social History Narrative     Not on file "     Social Determinants of Health     Financial Resource Strain: Not on file   Food Insecurity: Not on file   Transportation Needs: Not on file   Physical Activity: Not on file   Stress: Not on file   Social Connections: Not on file   Intimate Partner Violence: Not on file   Housing Stability: Not on file       MEDS:  See EMR, reviewed  ALL:  See EMR, reviewed    REVIEW OF SYSTEMS:  CONSTITUTIONAL:NEGATIVE for fever, chills, change in weight  INTEGUMENTARY/SKIN: NEGATIVE for worrisome rashes, moles or lesions  EYES: NEGATIVE for vision changes or irritation  ENT/MOUTH: NEGATIVE for ear, mouth and throat problems  RESP:NEGATIVE for significant cough or SOB  BREAST: NEGATIVE for masses, tenderness or discharge  CV: NEGATIVE for chest pain, palpitations or peripheral edema  GI: NEGATIVE for nausea, abdominal pain, heartburn, or change in bowel habits  :NEGATIVE for frequency, dysuria, or hematuria  :NEGATIVE for frequency, dysuria, or hematuria  NEURO: NEGATIVE for weakness, dizziness or paresthesias  ENDOCRINE: NEGATIVE for temperature intolerance, skin/hair changes  HEME/ALLERGY/IMMUNE: NEGATIVE for bleeding problems  PSYCHIATRIC: NEGATIVE for changes in mood or affect          Objective: Strength is intact in the right upper extremity of biceps triceps, wrist extension, flexion, grasp, digit he minimi, pincer grasp, thumb extension, thumb opponens.  No thenar or hypothenar atrophy.  She is nontender about the ulnar groove at the elbow.  No swelling in the forearm or at the wrist.  Phalen sign is positive reproducing tingling into the second and third fingers.  Tinel's sign is negative.  Appropriate in conversation affect.      Assessment right sided carpal tunnel symptoms    Plan: She will use an over-the-counter night splint that we discussed pictures of together, she will use it for the next 6 weeks.  She was given a sheet of nerve glide exercises to do twice daily.  An EMG will be scheduled of the right upper  extremity and will have a follow-up phone or virtual visit after the EMG to go over results.        Henrik Tompkins MD

## 2022-03-10 NOTE — PROGRESS NOTES
"Sports Medicine Clinic Visit    PCP: Jose Eduardo Madrigal Cyndee Alvarez is a 26 year old female who is seen  in consultation at the request of Dr. Madrigal presenting with right wrist carpal tunnel syndrome sx. Right hand dominant.     She has a tendency to notice aching discomfort in the forearm and into the hand that is worse in the early hours of the morning.  She will have numbness that she will feel \"in all the fingers\" of the right hand, although the numbness is intermittent.  It can be present towards the end of the day.  Is not present at this time.  Does happen intermittently.    She is not involved in employment that involves repetitive gripping and grasping at this time.     Injury: No injury, onset about 6 months ago    Location of Pain: right wrist and hand  Duration of Pain: 6 months  Rating of Pain: 0/10  Pain is better with: massage  Pain is worse with: at night   Additional Features: paresthesias   Treatment so far consists of: massage  Prior History of related problems: none    Ht 1.499 m (4' 11\")   Wt 69.9 kg (154 lb)   BMI 31.10 kg/m         PMH:  No past medical history on file.    Active problem list:  Patient Active Problem List   Diagnosis     Dysfunctional Uterine Bleeding     Carpal tunnel syndrome of right wrist       FH:  No family history on file.    SH:  Social History     Socioeconomic History     Marital status:      Spouse name: Not on file     Number of children: Not on file     Years of education: Not on file     Highest education level: Not on file   Occupational History     Not on file   Tobacco Use     Smoking status: Never Smoker     Smokeless tobacco: Never Used   Substance and Sexual Activity     Alcohol use: No     Drug use: No     Sexual activity: Yes     Partners: Male     Birth control/protection: None   Other Topics Concern     Not on file   Social History Narrative     Not on file     Social Determinants of Health     Financial Resource Strain: Not on file   Food " Insecurity: Not on file   Transportation Needs: Not on file   Physical Activity: Not on file   Stress: Not on file   Social Connections: Not on file   Intimate Partner Violence: Not on file   Housing Stability: Not on file       MEDS:  See EMR, reviewed  ALL:  See EMR, reviewed    REVIEW OF SYSTEMS:  CONSTITUTIONAL:NEGATIVE for fever, chills, change in weight  INTEGUMENTARY/SKIN: NEGATIVE for worrisome rashes, moles or lesions  EYES: NEGATIVE for vision changes or irritation  ENT/MOUTH: NEGATIVE for ear, mouth and throat problems  RESP:NEGATIVE for significant cough or SOB  BREAST: NEGATIVE for masses, tenderness or discharge  CV: NEGATIVE for chest pain, palpitations or peripheral edema  GI: NEGATIVE for nausea, abdominal pain, heartburn, or change in bowel habits  :NEGATIVE for frequency, dysuria, or hematuria  :NEGATIVE for frequency, dysuria, or hematuria  NEURO: NEGATIVE for weakness, dizziness or paresthesias  ENDOCRINE: NEGATIVE for temperature intolerance, skin/hair changes  HEME/ALLERGY/IMMUNE: NEGATIVE for bleeding problems  PSYCHIATRIC: NEGATIVE for changes in mood or affect          Objective: Strength is intact in the right upper extremity of biceps triceps, wrist extension, flexion, grasp, digit he minimi, pincer grasp, thumb extension, thumb opponens.  No thenar or hypothenar atrophy.  She is nontender about the ulnar groove at the elbow.  No swelling in the forearm or at the wrist.  Phalen sign is positive reproducing tingling into the second and third fingers.  Tinel's sign is negative.  Appropriate in conversation affect.      Assessment right sided carpal tunnel symptoms    Plan: She will use an over-the-counter night splint that we discussed pictures of together, she will use it for the next 6 weeks.  She was given a sheet of nerve glide exercises to do twice daily.  An EMG will be scheduled of the right upper extremity and will have a follow-up phone or virtual visit after the EMG to go over  results.

## 2022-03-12 LAB — STRONGYLOIDES IGG SER IA-ACNC: 0.7 IV

## 2022-03-12 NOTE — TELEPHONE ENCOUNTER
I do not see any evidence of this in the record    Please have documentation faxed to our clinic regarding this diagnosis    Curt

## 2022-03-14 ENCOUNTER — PATIENT OUTREACH (OUTPATIENT)
Dept: ONCOLOGY | Facility: CLINIC | Age: 27
End: 2022-03-14
Payer: COMMERCIAL

## 2022-03-14 LAB
HEMOGLOBIN A2 QUANTITATION: 2.5 % (ref 2.2–3.5)
HEMOGLOBIN ELECTROPHRESIS: NORMAL
HEMOGLOBIN F QUANTITATION: <0.8 % (ref 0–2)
PATH ICD:: NORMAL
REVIEWING PATHOLOGIST: NORMAL

## 2022-03-14 NOTE — TELEPHONE ENCOUNTER
Spoke with patient herself who states the Lukemia dx is not accurate. States she believes there may be some confusion with her mother who seems to be the one who first called to inquire about the dx. Patient states she was referred to an oncologist to follow up with a possible dx of lukemia but has not gone through with any testing. Pt verbalized understanding that she should follow up to either confirm or deny dx and keep PCP in the loop.

## 2022-03-17 NOTE — PROGRESS NOTES
RECORDS STATUS - ALL OTHER DIAGNOSIS      RECORDS RECEIVED FROM: [facility]   DATE RECEIVED: 4/14/2022   NOTES STATUS DETAILS   OFFICE NOTE from referring provider Complete [facility]   Referral from Jose Eduardo Madrigal MD, records in [facility]   DISCHARGE SUMMARY from hospital     DISCHARGE REPORT from the ER     OPERATIVE REPORT Complete [facility] 3/10/2022 EMG   MEDICATION LIST Complete [facility]   CLINICAL TRIAL TREATMENTS TO DATE     LABS     PATHOLOGY REPORTS     ANYTHING RELATED TO DIAGNOSIS Complete Labs last updated on 3/8/2022   GENONOMIC TESTING     TYPE:     IMAGING (NEED IMAGES & REPORT)     CT SCANS Complete CT Chest Pulmonary 7/4/2021   MRI     MAMMO     Xray Chest Complete 3/8/2022   ULTRASOUND Complete US Pelvic Complete    PET       Action    Action Taken 3/17/2022 9:06AM KEB     I called pt Pa - unavailable.

## 2022-04-14 ENCOUNTER — PRE VISIT (OUTPATIENT)
Dept: ONCOLOGY | Facility: HOSPITAL | Age: 27
End: 2022-04-14

## 2022-04-21 NOTE — PROGRESS NOTES
RECORDS STATUS - ALL OTHER DIAGNOSIS      RECORDS RECEIVED FROM: [facility]   DATE RECEIVED: 5/23/2022   NOTES STATUS DETAILS   OFFICE NOTE from referring provider Complete [facility]    Jose Eduardo Madrigal MD   DISCHARGE SUMMARY from hospital     DISCHARGE REPORT from the ER     OPERATIVE REPORT     MEDICATION LIST Complete [facility]   CLINICAL TRIAL TREATMENTS TO DATE     LABS     PATHOLOGY REPORTS     ANYTHING RELATED TO DIAGNOSIS Complete Labs last updated on 3/8/2022   GENONOMIC TESTING     TYPE:     IMAGING (NEED IMAGES & REPORT)     CT SCANS     MRI     Xray Chest Complete 3/8/2022   MAMMO     ULTRASOUND Complete US Pelvis Complete   PET

## 2022-05-23 ENCOUNTER — PRE VISIT (OUTPATIENT)
Dept: ONCOLOGY | Facility: HOSPITAL | Age: 27
End: 2022-05-23

## 2022-05-25 NOTE — PROGRESS NOTES
Patient scheduled for consult with Dr Hess on 5/23 but did not attend or cancel appointment. Request for no-show letter initiated and referral resent to [employer] for completion of reschedule.

## 2022-07-24 ENCOUNTER — HEALTH MAINTENANCE LETTER (OUTPATIENT)
Age: 27
End: 2022-07-24

## 2022-10-02 ENCOUNTER — HEALTH MAINTENANCE LETTER (OUTPATIENT)
Age: 27
End: 2022-10-02

## 2023-08-12 ENCOUNTER — HEALTH MAINTENANCE LETTER (OUTPATIENT)
Age: 28
End: 2023-08-12

## 2024-10-05 ENCOUNTER — HEALTH MAINTENANCE LETTER (OUTPATIENT)
Age: 29
End: 2024-10-05